# Patient Record
Sex: MALE | Race: OTHER | HISPANIC OR LATINO | URBAN - METROPOLITAN AREA
[De-identification: names, ages, dates, MRNs, and addresses within clinical notes are randomized per-mention and may not be internally consistent; named-entity substitution may affect disease eponyms.]

---

## 2023-08-26 ENCOUNTER — EMERGENCY (EMERGENCY)
Facility: HOSPITAL | Age: 33
LOS: 0 days | Discharge: HOME | End: 2023-08-26
Attending: EMERGENCY MEDICINE
Payer: COMMERCIAL

## 2023-08-26 VITALS
DIASTOLIC BLOOD PRESSURE: 71 MMHG | TEMPERATURE: 98 F | HEART RATE: 76 BPM | OXYGEN SATURATION: 96 % | RESPIRATION RATE: 18 BRPM | SYSTOLIC BLOOD PRESSURE: 136 MMHG

## 2023-08-26 VITALS
SYSTOLIC BLOOD PRESSURE: 138 MMHG | DIASTOLIC BLOOD PRESSURE: 83 MMHG | OXYGEN SATURATION: 95 % | HEART RATE: 80 BPM | RESPIRATION RATE: 19 BRPM | TEMPERATURE: 98 F
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
## 2023-08-26 DIAGNOSIS — S22.41XA MULTIPLE FRACTURES OF RIBS, RIGHT SIDE, INITIAL ENCOUNTER FOR CLOSED FRACTURE: ICD-10-CM

## 2023-08-26 DIAGNOSIS — R07.89 OTHER CHEST PAIN: ICD-10-CM
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
## 2023-08-26 DIAGNOSIS — R10.9 UNSPECIFIED ABDOMINAL PAIN: ICD-10-CM

## 2023-08-26 PROCEDURE — 99284 EMERGENCY DEPT VISIT MOD MDM: CPT

## 2023-08-26 PROCEDURE — 99284 EMERGENCY DEPT VISIT MOD MDM: CPT | Mod: 25

## 2023-08-26 PROCEDURE — 71250 CT THORAX DX C-: CPT | Mod: 26,MA

## 2023-08-26 PROCEDURE — 71250 CT THORAX DX C-: CPT | Mod: MA

## 2023-08-26 RX ORDER — LIDOCAINE 4 G/100G
1 CREAM TOPICAL ONCE
Refills: 0 | Status: COMPLETED | OUTPATIENT
Start: 2023-08-26 | End: 2023-08-26

## 2023-08-26 RX ORDER — IBUPROFEN 200 MG
1 TABLET ORAL
Qty: 15 | Refills: 0
Start: 2023-08-26 | End: 2023-08-30

## 2023-08-26 RX ORDER — ACETAMINOPHEN 500 MG
1000 TABLET ORAL ONCE
Refills: 0 | Status: COMPLETED | OUTPATIENT
Start: 2023-08-26 | End: 2023-08-26

## 2023-08-26 RX ORDER — LIDOCAINE 4 G/100G
1 CREAM TOPICAL
Qty: 10 | Refills: 0
Start: 2023-08-26 | End: 2023-08-30

## 2023-08-26 RX ADMIN — LIDOCAINE 1 PATCH: 4 CREAM TOPICAL at 23:07

## 2023-08-26 RX ADMIN — Medication 1000 MILLIGRAM(S): at 23:07

## 2023-08-26 NOTE — ED STATDOCS - ATTENDING CONTRIBUTION TO CARE
Attending Contribution to Care: I, Maira Plunkett, performed the initial face to face bedside interview with this patient regarding history of present illness, review of symptoms and relevant past medical, social and family history.  I completed an independent physical examination.  I was the initial provider who evaluated this patient. I have signed out the follow up of any pending tests (i.e. labs, radiological studies) to the resident.  I have communicated the patient’s plan of care and disposition with the resident.

## 2023-08-26 NOTE — ED STATDOCS - PATIENT PORTAL LINK FT
You can access the FollowMyHealth Patient Portal offered by [facility] by registering at the following website: [email or web address]. By joining [employer]’s FollowMyHealth portal, you will also be able to view your health information using other applications (apps) compatible with our system.

## 2023-08-26 NOTE — ED STATDOCS - OBJECTIVE STATEMENT
32-year-old male without any significant past medical conditions chief complaint of right-sided flank pain.  Patient notes symptoms started approximately 3 weeks ago after a fall from a couple of steps height.  Patient states that he has been having the pain on and off went to urgent care got x-rays were negative for acute fracture

## 2023-08-26 NOTE — ED STATDOCS - CLINICAL SUMMARY MEDICAL DECISION MAKING FREE TEXT BOX
32-year-old male without any significant past medical history chief complaint of right chest pain given history and physical clinical CERT for rib fractures will assess with CT scan likely discharge home after things negative

## 2023-08-26 NOTE — ED STATDOCS - PHYSICAL EXAMINATION
GENERAL: Awake, alert, NAD  CARDIAC: RRR, no m/r/g Tenderness with AP compression along the right sided chest wall  ABDOMEN: Soft, non tender, non distended, no rebound, no guarding  BACK: No midline spinal tenderness, no CVA tenderness  EXT: No edema, no calf tenderness, 2+ DP pulses bilaterally, no deformities.  NEURO: A&Ox3. Moving all extremities.  SKIN: Warm and dry. No rash.  PSYCH: Normal affect.

## 2023-08-26 NOTE — ED ADULT TRIAGE NOTE - CHIEF COMPLAINT QUOTE
pt ambulatory to ED c/o fall. pt states he fell onto his right side several weeks ago, seen in UC, no internal injuries found. pt endorses continued flank pain, difficulty taking a deep breath, no ecchymosis. took advil PTA

## 2023-08-26 NOTE — ED STATDOCS - NS ED ATTENDING STATEMENT MOD
I have seen and examined this patient and fully participated in the care of this patient as the teaching attending.  The service was shared with the JT.  I reviewed and verified the documentation and independently performed the documented:

## 2023-08-26 NOTE — ED STATDOCS - NSFOLLOWUPINSTRUCTIONS_ED_ALL_ED_FT
Use lidocaine patch every 12 hours as needed for pain.  Apply ice compresses to area as needed for pain.  Use lung incentive spirometer for lung exercises 10 times a day for 3-5 minutes  Take ibuprofen or tylenol as needed for pain.  See your doctor within 1 week.    English    Rib Fracture    A rib fracture is a break or crack in one of the bones of the ribs. The ribs are like a cage that goes around your upper chest. A broken or cracked rib is often painful, but most do not cause other problems. Most rib fractures usually heal on their own in 1–3 months.    What are the causes?  Doing movements over and over again with a lot of force, such as pitching a baseball or having a very bad cough.  A direct hit to the chest.  Cancer that has spread to the bones.  What are the signs or symptoms?  Pain when you breathe in or cough.  Pain when someone presses on the injured area.  Feeling short of breath.  How is this treated?  Treatment depends on how bad the fracture is. In general:  Most rib fractures usually heal on their own in 1–3 months.  Healing may take longer if you have a cough or are doing activities that make the injury worse.  While you heal, you may be given medicines to control pain.  You will also be taught deep breathing exercises.  Very bad injuries may require a stay at the hospital or surgery.  Follow these instructions at home:  Managing pain, stiffness, and swelling    If told, put ice on the injured area. To do this:  Put ice in a plastic bag.  Place a towel between your skin and the bag.  Leave the ice on for 20 minutes, 2–3 times a day.  Take off the ice if your skin turns bright red. This is very important. If you cannot feel pain, heat, or cold, you have a greater risk of damage to the area.  Take over-the-counter and prescription medicines only as told by your doctor.  Activity    Avoid activities that cause pain to the injured area. Protect your injured area.  Slowly increase activity as told by your doctor.  General instructions    Do deep breathing exercises as told by your doctor. You may be told to:  Take deep breaths many times a day.  Cough several times a day while hugging a pillow.  Use a device (incentive spirometer) to do deep breathing many times a day.  Drink enough fluid to keep your pee (urine) clear or pale yellow.  Do not wear a rib belt or binder.  Keep all follow-up visits.  Contact a doctor if:  You have a fever.  Get help right away if:  You have trouble breathing.  You are short of breath.  You cannot stop coughing.  You cough up thick or bloody spit.  You feel like you may vomit (nauseous), vomit, or have belly (abdominal) pain.  Your pain gets worse and medicine does not help.  These symptoms may be an emergency. Get help right away. Call your local emergency services (911 in the [place]  Do not wait to see if the symptoms will go away.  Do not drive yourself to the hospital.  Summary  A rib fracture is a break or crack in one of the bones of the ribs.  Apply ice to the injured area and take medicines for pain as told by your doctor.  Take deep breaths and cough several times a day. Hug a pillow every time you cough.  This information is not intended to replace advice given to you by your health care provider. Make sure you discuss any questions you have with your health care provider.    Document Revised: 04/09/2021 Document Reviewed: 04/09/2021  Elsevier Patient Education © 2023 Elsevier Inc.

## 2023-11-29 ENCOUNTER — EMERGENCY (EMERGENCY)
Facility: HOSPITAL | Age: 33
LOS: 0 days | Discharge: HOME | End: 2023-11-29
Attending: EMERGENCY MEDICINE
Payer: COMMERCIAL

## 2023-11-29 VITALS
TEMPERATURE: 98 F | SYSTOLIC BLOOD PRESSURE: 143 MMHG | OXYGEN SATURATION: 96 % | DIASTOLIC BLOOD PRESSURE: 96 MMHG | RESPIRATION RATE: 18 BRPM | HEART RATE: 98 BPM

## 2023-11-29 VITALS — HEIGHT: 74 IN | WEIGHT: 315 LBS

## 2023-11-29 DIAGNOSIS — E11.65 TYPE 2 DIABETES MELLITUS WITH HYPERGLYCEMIA: ICD-10-CM

## 2023-11-29 DIAGNOSIS — M79.671 PAIN IN RIGHT FOOT: ICD-10-CM

## 2023-11-29 DIAGNOSIS — H53.8 OTHER VISUAL DISTURBANCES: ICD-10-CM

## 2023-11-29 DIAGNOSIS — Z86.39 PERSONAL HISTORY OF OTHER ENDOCRINE, NUTRITIONAL AND METABOLIC DISEASE: ICD-10-CM

## 2023-11-29 DIAGNOSIS — R73.9 HYPERGLYCEMIA, UNSPECIFIED: ICD-10-CM

## 2023-11-29 LAB
ALBUMIN SERPL ELPH-MCNC: 3.3 G/DL — SIGNIFICANT CHANGE UP (ref 3.3–5)
ALBUMIN SERPL ELPH-MCNC: 3.3 G/DL — SIGNIFICANT CHANGE UP (ref 3.3–5)
ALP SERPL-CCNC: 97 U/L — SIGNIFICANT CHANGE UP (ref 40–120)
ALP SERPL-CCNC: 97 U/L — SIGNIFICANT CHANGE UP (ref 40–120)
ALT FLD-CCNC: 115 U/L — HIGH (ref 12–78)
ALT FLD-CCNC: 115 U/L — HIGH (ref 12–78)
ANION GAP SERPL CALC-SCNC: 9 MMOL/L — SIGNIFICANT CHANGE UP (ref 5–17)
ANION GAP SERPL CALC-SCNC: 9 MMOL/L — SIGNIFICANT CHANGE UP (ref 5–17)
AST SERPL-CCNC: 51 U/L — HIGH (ref 15–37)
AST SERPL-CCNC: 51 U/L — HIGH (ref 15–37)
BASOPHILS # BLD AUTO: 0.05 K/UL — SIGNIFICANT CHANGE UP (ref 0–0.2)
BASOPHILS # BLD AUTO: 0.05 K/UL — SIGNIFICANT CHANGE UP (ref 0–0.2)
BASOPHILS NFR BLD AUTO: 0.4 % — SIGNIFICANT CHANGE UP (ref 0–2)
BASOPHILS NFR BLD AUTO: 0.4 % — SIGNIFICANT CHANGE UP (ref 0–2)
BILIRUB SERPL-MCNC: 0.9 MG/DL — SIGNIFICANT CHANGE UP (ref 0.2–1.2)
BILIRUB SERPL-MCNC: 0.9 MG/DL — SIGNIFICANT CHANGE UP (ref 0.2–1.2)
BUN SERPL-MCNC: 18 MG/DL — SIGNIFICANT CHANGE UP (ref 7–23)
BUN SERPL-MCNC: 18 MG/DL — SIGNIFICANT CHANGE UP (ref 7–23)
CALCIUM SERPL-MCNC: 8.6 MG/DL — SIGNIFICANT CHANGE UP (ref 8.5–10.1)
CALCIUM SERPL-MCNC: 8.6 MG/DL — SIGNIFICANT CHANGE UP (ref 8.5–10.1)
CHLORIDE SERPL-SCNC: 110 MMOL/L — HIGH (ref 96–108)
CHLORIDE SERPL-SCNC: 110 MMOL/L — HIGH (ref 96–108)
CO2 SERPL-SCNC: 23 MMOL/L — SIGNIFICANT CHANGE UP (ref 22–31)
CO2 SERPL-SCNC: 23 MMOL/L — SIGNIFICANT CHANGE UP (ref 22–31)
CREAT SERPL-MCNC: 0.79 MG/DL — SIGNIFICANT CHANGE UP (ref 0.5–1.3)
CREAT SERPL-MCNC: 0.79 MG/DL — SIGNIFICANT CHANGE UP (ref 0.5–1.3)
EGFR: 121 ML/MIN/1.73M2 — SIGNIFICANT CHANGE UP
EGFR: 121 ML/MIN/1.73M2 — SIGNIFICANT CHANGE UP
EOSINOPHIL # BLD AUTO: 0.19 K/UL — SIGNIFICANT CHANGE UP (ref 0–0.5)
EOSINOPHIL # BLD AUTO: 0.19 K/UL — SIGNIFICANT CHANGE UP (ref 0–0.5)
EOSINOPHIL NFR BLD AUTO: 1.7 % — SIGNIFICANT CHANGE UP (ref 0–6)
EOSINOPHIL NFR BLD AUTO: 1.7 % — SIGNIFICANT CHANGE UP (ref 0–6)
GLUCOSE BLDC GLUCOMTR-MCNC: 230 MG/DL — HIGH (ref 70–99)
GLUCOSE BLDC GLUCOMTR-MCNC: 230 MG/DL — HIGH (ref 70–99)
GLUCOSE SERPL-MCNC: 314 MG/DL — HIGH (ref 70–99)
GLUCOSE SERPL-MCNC: 314 MG/DL — HIGH (ref 70–99)
HCT VFR BLD CALC: 43.6 % — SIGNIFICANT CHANGE UP (ref 39–50)
HCT VFR BLD CALC: 43.6 % — SIGNIFICANT CHANGE UP (ref 39–50)
HGB BLD-MCNC: 15.2 G/DL — SIGNIFICANT CHANGE UP (ref 13–17)
HGB BLD-MCNC: 15.2 G/DL — SIGNIFICANT CHANGE UP (ref 13–17)
IMM GRANULOCYTES NFR BLD AUTO: 0.4 % — SIGNIFICANT CHANGE UP (ref 0–0.9)
IMM GRANULOCYTES NFR BLD AUTO: 0.4 % — SIGNIFICANT CHANGE UP (ref 0–0.9)
LYMPHOCYTES # BLD AUTO: 38.3 % — SIGNIFICANT CHANGE UP (ref 13–44)
LYMPHOCYTES # BLD AUTO: 38.3 % — SIGNIFICANT CHANGE UP (ref 13–44)
LYMPHOCYTES # BLD AUTO: 4.26 K/UL — HIGH (ref 1–3.3)
LYMPHOCYTES # BLD AUTO: 4.26 K/UL — HIGH (ref 1–3.3)
MCHC RBC-ENTMCNC: 28.2 PG — SIGNIFICANT CHANGE UP (ref 27–34)
MCHC RBC-ENTMCNC: 28.2 PG — SIGNIFICANT CHANGE UP (ref 27–34)
MCHC RBC-ENTMCNC: 34.9 GM/DL — SIGNIFICANT CHANGE UP (ref 32–36)
MCHC RBC-ENTMCNC: 34.9 GM/DL — SIGNIFICANT CHANGE UP (ref 32–36)
MCV RBC AUTO: 80.9 FL — SIGNIFICANT CHANGE UP (ref 80–100)
MCV RBC AUTO: 80.9 FL — SIGNIFICANT CHANGE UP (ref 80–100)
MONOCYTES # BLD AUTO: 0.91 K/UL — HIGH (ref 0–0.9)
MONOCYTES # BLD AUTO: 0.91 K/UL — HIGH (ref 0–0.9)
MONOCYTES NFR BLD AUTO: 8.2 % — SIGNIFICANT CHANGE UP (ref 2–14)
MONOCYTES NFR BLD AUTO: 8.2 % — SIGNIFICANT CHANGE UP (ref 2–14)
NEUTROPHILS # BLD AUTO: 5.66 K/UL — SIGNIFICANT CHANGE UP (ref 1.8–7.4)
NEUTROPHILS # BLD AUTO: 5.66 K/UL — SIGNIFICANT CHANGE UP (ref 1.8–7.4)
NEUTROPHILS NFR BLD AUTO: 51 % — SIGNIFICANT CHANGE UP (ref 43–77)
NEUTROPHILS NFR BLD AUTO: 51 % — SIGNIFICANT CHANGE UP (ref 43–77)
PLATELET # BLD AUTO: 277 K/UL — SIGNIFICANT CHANGE UP (ref 150–400)
PLATELET # BLD AUTO: 277 K/UL — SIGNIFICANT CHANGE UP (ref 150–400)
POTASSIUM SERPL-MCNC: 3.8 MMOL/L — SIGNIFICANT CHANGE UP (ref 3.5–5.3)
POTASSIUM SERPL-MCNC: 3.8 MMOL/L — SIGNIFICANT CHANGE UP (ref 3.5–5.3)
POTASSIUM SERPL-SCNC: 3.8 MMOL/L — SIGNIFICANT CHANGE UP (ref 3.5–5.3)
POTASSIUM SERPL-SCNC: 3.8 MMOL/L — SIGNIFICANT CHANGE UP (ref 3.5–5.3)
PROT SERPL-MCNC: 7.9 GM/DL — SIGNIFICANT CHANGE UP (ref 6–8.3)
PROT SERPL-MCNC: 7.9 GM/DL — SIGNIFICANT CHANGE UP (ref 6–8.3)
RBC # BLD: 5.39 M/UL — SIGNIFICANT CHANGE UP (ref 4.2–5.8)
RBC # BLD: 5.39 M/UL — SIGNIFICANT CHANGE UP (ref 4.2–5.8)
RBC # FLD: 12.8 % — SIGNIFICANT CHANGE UP (ref 10.3–14.5)
RBC # FLD: 12.8 % — SIGNIFICANT CHANGE UP (ref 10.3–14.5)
SODIUM SERPL-SCNC: 142 MMOL/L — SIGNIFICANT CHANGE UP (ref 135–145)
SODIUM SERPL-SCNC: 142 MMOL/L — SIGNIFICANT CHANGE UP (ref 135–145)
WBC # BLD: 11.12 K/UL — HIGH (ref 3.8–10.5)
WBC # BLD: 11.12 K/UL — HIGH (ref 3.8–10.5)
WBC # FLD AUTO: 11.12 K/UL — HIGH (ref 3.8–10.5)
WBC # FLD AUTO: 11.12 K/UL — HIGH (ref 3.8–10.5)

## 2023-11-29 PROCEDURE — 36415 COLL VENOUS BLD VENIPUNCTURE: CPT

## 2023-11-29 PROCEDURE — 99284 EMERGENCY DEPT VISIT MOD MDM: CPT

## 2023-11-29 PROCEDURE — 73630 X-RAY EXAM OF FOOT: CPT | Mod: 26,RT

## 2023-11-29 PROCEDURE — 80053 COMPREHEN METABOLIC PANEL: CPT

## 2023-11-29 PROCEDURE — 73630 X-RAY EXAM OF FOOT: CPT | Mod: RT

## 2023-11-29 PROCEDURE — 82962 GLUCOSE BLOOD TEST: CPT

## 2023-11-29 PROCEDURE — 85025 COMPLETE CBC W/AUTO DIFF WBC: CPT

## 2023-11-29 PROCEDURE — 99283 EMERGENCY DEPT VISIT LOW MDM: CPT | Mod: 25

## 2023-11-29 RX ORDER — SODIUM CHLORIDE 9 MG/ML
1000 INJECTION INTRAMUSCULAR; INTRAVENOUS; SUBCUTANEOUS ONCE
Refills: 0 | Status: COMPLETED | OUTPATIENT
Start: 2023-11-29 | End: 2023-11-29

## 2023-11-29 RX ORDER — METFORMIN HYDROCHLORIDE 850 MG/1
500 TABLET ORAL ONCE
Refills: 0 | Status: COMPLETED | OUTPATIENT
Start: 2023-11-29 | End: 2023-11-29

## 2023-11-29 RX ORDER — METFORMIN HYDROCHLORIDE 850 MG/1
1 TABLET ORAL
Qty: 60 | Refills: 0
Start: 2023-11-29 | End: 2023-12-28

## 2023-11-29 RX ADMIN — SODIUM CHLORIDE 1000 MILLILITER(S): 9 INJECTION INTRAMUSCULAR; INTRAVENOUS; SUBCUTANEOUS at 22:24

## 2023-11-29 RX ADMIN — METFORMIN HYDROCHLORIDE 500 MILLIGRAM(S): 850 TABLET ORAL at 20:55

## 2023-11-29 RX ADMIN — SODIUM CHLORIDE 1000 MILLILITER(S): 9 INJECTION INTRAMUSCULAR; INTRAVENOUS; SUBCUTANEOUS at 21:09

## 2023-11-29 NOTE — ED ADULT NURSE NOTE - OBJECTIVE STATEMENT
Pt presents to ED Pt presents to ED c/o vision changes and foot pain. Pt states he started having blurry vision intermittently that started yesterday and got worse this morning while exercising. Pt states he has had foot pain for a couple weeks that has not gone away. Pt has hx of DM without medication use. Pt denies cp, fevers, chills, SOB.

## 2023-11-29 NOTE — ED STATDOCS - CLINICAL SUMMARY MEDICAL DECISION MAKING FREE TEXT BOX
34 yo M with hx of hyperglycemia and diagnosis of DM in 2019 now presents with similar symptoms to years ago, has intermittent blurry vision.  Also complains of right foot pain, likely plantar fasciitis.  Patient to get labs, IV fluids, initiate metformin, and get xray of right foot.  If symptoms improve with treatment will refer to [facility] [place]. 32 yo M with hx of hyperglycemia and diagnosis of DM in 2019 now presents with similar symptoms to years ago, has intermittent blurry vision.  Also complains of right foot pain, likely plantar fasciitis.  Patient to get labs, IV fluids, initiate metformin, and get xray of right foot.  If symptoms improve with treatment will refer to [facility]. 32 yo M with hx of hyperglycemia and diagnosis of DM in 2019 now presents with similar symptoms to years ago, has intermittent blurry vision.  Also complains of right foot pain, likely plantar fasciitis.  Patient to get labs, IV fluids, initiate metformin, and get xray of right foot.  If symptoms improve with treatment will refer to [facility]. 32 yo M with hx of hyperglycemia and diagnosis of DM in 2019 now presents with similar symptoms to years ago, has intermittent blurry vision.  Also complains of right foot pain, likely plantar fasciitis.  Patient to get labs, IV fluids, initiate metformin, and get xray of right foot.  If symptoms improve with treatment will refer to [facility].    Hyperglycemia improved with IV fluids.  Asymptomatic at this time.  Will start metformin, refer to [facility] and give return precautions.  No evidence of metabolic acidosis .

## 2023-11-29 NOTE — ED STATDOCS - PATIENT PORTAL LINK FT
You can access the FollowMyHealth Patient Portal offered by [facility] by registering at the following website: [email or web address]. By joining [employer]’s FollowMyHealth portal, you will also be able to view your health information using other applications (apps) compatible with our system. You can access the FollowMyHealth Patient Portal offered by [facility] by registering at the following website: [email or web address]. By joining [employer]’s FollowMyHealth portal, you will also be able to view your health information using other applications (apps) compatible with our system. You can access the FollowMyHealth Patient Portal offered by [facility] by registering at the following website: [email or web address]. By joining [employer]’s FollowMyHealth portal, you will also be able to view your health information using other applications (apps) compatible with our system.

## 2023-11-29 NOTE — ED STATDOCS - NSFOLLOWUPINSTRUCTIONS_ED_ALL_ED_FT
English    Hyperglycemia  Hyperglycemia is when the sugar (glucose) level in your blood is too high. High blood sugar can happen to people who have or do not have diabetes. High blood sugar can happen quickly. It can be an emergency.    What are the causes?  If you have diabetes, high blood sugar may be caused by:  Medicines that increase blood sugar or affect your control of diabetes.  Getting less physical activity.  Overeating.  Being sick or injured or having an infection.  Having surgery.  Stress.  Not giving yourself enough insulin (if you are taking it).  You may have high blood sugar because you have diabetes that has not been diagnosed yet.    If you do not have diabetes, high blood sugar may be caused by:  Certain medicines.  Stress.  A bad illness.  An infection.  Having surgery.  Diseases of the pancreas.  What increases the risk?  This condition is more likely to develop in people who have risk factors for diabetes, such as:  Having a family member with diabetes.  Certain conditions in which the body's defense system (immune system) attacks itself. These are called autoimmune disorders.  Being overweight.  Not being active.  Having a condition called insulin resistance.  Having a history of:  Prediabetes.  Diabetes when pregnant.  Polycystic ovarian syndrome (PCOS).  What are the signs or symptoms?  This condition may not cause symptoms. If you do have symptoms, they may include:  Feeling more thirsty than normal.  Needing to pee (urinate) more often than normal.  Hunger.  Feeling very tired.  Blurry eyesight (vision).  You may get other symptoms as the condition gets worse, such as:  Dry mouth.  Pain in your belly (abdomen).  Not being hungry (loss of appetite).  Breath that smells fruity.  Weakness.  Weight loss that is not planned.  A tingling or numb feeling in your hands or feet.  A headache.  Cuts or bruises that heal slowly.  How is this treated?  Treatment depends on the cause of your condition. Treatment may include:  Taking medicine to control your blood sugar levels.  Changing your medicine or dosage if you take insulin or other diabetes medicines.  Lifestyle changes. These may include:  Exercising more.  Eating healthier foods.  Losing weight.  Treating an illness or infection.  Checking your blood sugar more often.  Stopping or reducing steroid medicines.  If your condition gets very bad, you will need to be treated in the hospital.    Follow these instructions at home:  General instructions    Take over-the-counter and prescription medicines only as told by your doctor.  Do not smoke or use any products that contain nicotine or tobacco. If you need help quitting, ask your doctor.  If you drink alcohol:  Limit how much you have to:  0–1 drink a day for women who are not pregnant.  0–2 drinks a day for men.  Know how much alcohol is in a drink. In the [place] one drink equals one 12 oz bottle of beer (355 mL), one 5 oz glass of wine (148 mL), or one 1½ oz glass of hard liquor (44 mL).  Manage stress. If you need help with this, ask your doctor.  Do exercises as told by your doctor.  Keep all follow-up visits.  Eating and drinking      Stay at a healthy weight.  Make sure you drink enough fluid when you:  Exercise.  Get sick.  Are in hot temperatures.  Drink enough fluid to keep your pee (urine) pale yellow.  If you have diabetes:      Know the symptoms of high blood sugar.  Follow your diabetes management plan as told by your doctor. Make sure you:  Take insulin and medicines as told.  Follow your exercise plan.  Follow your meal plan. Eat on time. Do not skip meals.  Check your blood sugar as often as told. Make sure you check before and after exercise. If you exercise longer or in a different way, check your blood sugar more often.  Follow your sick day plan whenever you cannot eat or drink normally. Make this plan ahead of time with your doctor.  Share your diabetes management plan with people in your workplace, school, and household.  Check your pee for ketones when you are ill and as told by your doctor.  Carry a card or wear jewelry that says that you have diabetes.  Where to find more information  American Diabetes Association: www.diabetes.org    Contact a doctor if:  Your blood sugar level is at or above 240 mg/dL (13.3 mmol/L) for 2 days in a row.  You have problems keeping your blood sugar in your target range.  You have high blood pressure often.  You have signs of illness, such as:  Feeling like you may vomit (feeling nauseous).  Vomiting.  A fever.  Get help right away if:  Your blood sugar monitor reads "high" even when you are taking insulin.  You have trouble breathing.  You have a change in how you think, feel, or act (mental status).  You feel like you may vomit, and the feeling does not go away.  You cannot stop vomiting.  These symptoms may be an emergency. Get medical help right away. Call your local emergency services (911 in the [place]  Do not wait to see if the symptoms will go away.  Do not drive yourself to the hospital.  Summary  Hyperglycemia is when the sugar (glucose) level in your blood is too high.  High blood sugar can happen to people who have or do not have diabetes.  Make sure you drink enough fluids and follow your meal plan. Exercise as often as told by your doctor.  Contact your doctor if you have problems keeping your blood sugar in your target range.  This information is not intended to replace advice given to you by your health care provider. Make sure you discuss any questions you have with your health care provider.    Document Revised: 10/01/2021 Document Reviewed: 10/01/2021  [employer] Patient Education © 2023 Elsevier Inc.

## 2023-11-29 NOTE — ED STATDOCS - CARE PROVIDER_API CALL
Sami Stuart.  Podiatric Medicine and Surgery  [address] [place] [id number]  Phone: [phone]  Fax: [phone]  Follow Up Time:    Sami Stuart.  Podiatric Medicine and Surgery  [address] [place] [id number]  Phone: [phone]  Fax: [phone]  Follow Up Time:    Sami Stuart.  Podiatric Medicine and Surgery  [address] [place] [address]  Phone: [phone]  Fax: [phone]  Follow Up Time:

## 2023-11-29 NOTE — ED STATDOCS - OBJECTIVE STATEMENT
Pt. is a 32 yo M with obesity and hx of hyperglycemia in 2019 with 3 days hospitalization in [place] for new onset diabetes, now off all meds presents due to intermittent blurry vision since yesterday.  Patient denies headache or eye injury.  He had same blurry vision with hyperglycemia in 2019.  Patient went to an urgent care center and glucose was 290s.  He was sent to the ED for evaluation.  Does not have a local doctor.  Denies recent illness.  Also went to urgent care due to pain on the bottom of his right foot for several days.  States he was sedentary over the Thanksgiving weekend and then returned onto his feet at work and started having pain on the bottom of his right heel and bottom of medial plantar side of right foot.  No wounds on feet.  No known injury.  No weakness or numbness or trouble standing or walking. Pt. is a 34 yo M with obesity and hx of hyperglycemia in 2019 with 3 days hospitalization in [place] for new onset diabetes, now off all meds presents due to intermittent blurry vision since yesterday.  Patient denies headache or eye injury.  He had same blurry vision with hyperglycemia in 2019.  Patient went to an urgent care center and glucose was 290s.  He was sent to the ED for evaluation.  Does not have a local doctor.  Denies recent illness.  Also went to urgent care due to pain on the bottom of his right foot for several days.  States he was sedentary over the Thanksgiving weekend and then returned onto his feet at work and started having pain on the bottom of his right heel and bottom of medial plantar side of right foot.  No wounds on feet.  No known injury.  No weakness or numbness or trouble standing or walking.

## 2023-11-29 NOTE — ED ADULT TRIAGE NOTE - CHIEF COMPLAINT QUOTE
Pt comes to the ED sent from urgent care for hyperglycemia. Pt states that he does not take any medication for diabetes and that he is supposed to watch his diet but he is non complaint.  in triage. Pt complains of blurry vision that started yesterday. Pt complains of a right foot sprain that occurred a few weeks ago and is still bothering him.

## 2023-11-29 NOTE — ED STATDOCS - NSFOLLOWUPCLINICS_GEN_ALL_ED_FT
[facility]  Family Medicine  [address] [place] [address]  Phone: [phone]  Fax:      [facility]  Family Medicine  [address] [place] [address]  Phone: [phone]  Fax:      [facility]  Family Medicine  [address] [place] [address]  Phone: [phone]  Fax:

## 2023-11-29 NOTE — ED STATDOCS - MUSCULOSKELETAL, MLM
range of motion is not limited ; no bony tenderness; +tenderness along plantar fascia of right foot; no skin discoloration. normal pedal pulses.